# Patient Record
Sex: FEMALE | Race: WHITE | NOT HISPANIC OR LATINO | Employment: OTHER
[De-identification: names, ages, dates, MRNs, and addresses within clinical notes are randomized per-mention and may not be internally consistent; named-entity substitution may affect disease eponyms.]

---

## 2018-01-18 NOTE — PATIENT DISCUSSION
The nature of primary open angle glaucoma was discussed with the patient. The risk factors, including race, thin cornea, age, family history, and high intraocular pressures were discussed. Treatment options including topical therapy, laser treatment, and in some cases surgery were discussed. The need for regular monitoring including visual fields and serial optic nerve imaging was discussed.

## 2018-04-17 ENCOUNTER — NEW PATIENT COMPREHENSIVE (OUTPATIENT)
Age: 63
End: 2018-04-17

## 2018-04-17 DIAGNOSIS — H52.4: ICD-10-CM

## 2018-04-17 DIAGNOSIS — H25.13: ICD-10-CM

## 2018-04-17 PROCEDURE — 92015 DETERMINE REFRACTIVE STATE: CPT

## 2018-04-17 PROCEDURE — 92004 COMPRE OPH EXAM NEW PT 1/>: CPT

## 2018-04-17 ASSESSMENT — KERATOMETRY
OS_K2POWER_DIOPTERS: 44.25
OD_K2POWER_DIOPTERS: 44.00
OD_K1POWER_DIOPTERS: 43.50
OS_K1POWER_DIOPTERS: 43.50
OD_AXISANGLE2_DEGREES: 78
OS_AXISANGLE_DEGREES: 26
OS_AXISANGLE2_DEGREES: 116
OD_AXISANGLE_DEGREES: 168

## 2018-04-17 ASSESSMENT — VISUAL ACUITY
OS_SC: J2
OS_CC: 20/20-1
OD_CC: J3
OD_CC: 20/20-1
OD_SC: 20/20-1
OS_CC: J2
OS_SC: 20/25-1
OD_SC: J5

## 2018-04-17 ASSESSMENT — TONOMETRY
OD_IOP_MMHG: 18
OS_IOP_MMHG: 18

## 2018-10-26 NOTE — PATIENT DISCUSSION
Patient understands condition, prognosis and need for follow up care. <<-----Click here for Discharge Medication Review

## 2018-10-26 NOTE — PATIENT DISCUSSION
IOP elevated somewhat today. Pt states she is consistent with drops. Cont drops as before. IOP ck 4 wk. Consider medication adjustment as needed.

## 2019-01-08 NOTE — PATIENT DISCUSSION
Occusoft lid scrubs QHS. Importance of removing all makeup. Hot compresses followed by gentle lid massage.

## 2019-04-22 ENCOUNTER — ESTABLISHED COMPREHENSIVE EXAM (OUTPATIENT)
Dept: URBAN - METROPOLITAN AREA CLINIC 35 | Facility: CLINIC | Age: 64
End: 2019-04-22

## 2019-04-22 DIAGNOSIS — H52.4: ICD-10-CM

## 2019-04-22 PROCEDURE — 92014 COMPRE OPH EXAM EST PT 1/>: CPT

## 2019-04-22 PROCEDURE — 92015 DETERMINE REFRACTIVE STATE: CPT

## 2019-04-22 ASSESSMENT — KERATOMETRY
OS_K1POWER_DIOPTERS: 43.50
OS_AXISANGLE_DEGREES: 26
OD_AXISANGLE2_DEGREES: 78
OS_K2POWER_DIOPTERS: 44.25
OS_AXISANGLE2_DEGREES: 116
OD_K2POWER_DIOPTERS: 44.00
OD_K1POWER_DIOPTERS: 43.50
OD_AXISANGLE_DEGREES: 168

## 2019-04-22 ASSESSMENT — VISUAL ACUITY
OS_CC: J2
OU_CC: 20/20
OS_SC: 20/25-1
OS_CC: 20/20
OU_CC: J1
OD_CC: 20/20
OD_SC: 20/20
OU_SC: 20/20
OD_CC: J5-

## 2019-04-22 ASSESSMENT — TONOMETRY
OD_IOP_MMHG: 18
OS_IOP_MMHG: 18

## 2019-05-17 ENCOUNTER — DIAGNOSTICS ONLY (OUTPATIENT)
Dept: URBAN - METROPOLITAN AREA CLINIC 35 | Facility: CLINIC | Age: 64
End: 2019-05-17

## 2019-05-17 DIAGNOSIS — H35.371: ICD-10-CM

## 2019-05-17 PROCEDURE — 99211T TECH SERVICE

## 2019-05-17 PROCEDURE — 92134 CPTRZ OPH DX IMG PST SGM RTA: CPT

## 2019-05-17 ASSESSMENT — KERATOMETRY
OS_K1POWER_DIOPTERS: 43.50
OD_K1POWER_DIOPTERS: 43.50
OD_AXISANGLE_DEGREES: 168
OS_AXISANGLE_DEGREES: 26
OD_AXISANGLE2_DEGREES: 78
OS_K2POWER_DIOPTERS: 44.25
OD_K2POWER_DIOPTERS: 44.00
OS_AXISANGLE2_DEGREES: 116

## 2020-01-09 NOTE — PATIENT DISCUSSION
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  Possible artifact of trial lens rim--redo in 6 months--No change in tx.

## 2020-01-28 ENCOUNTER — EST. PATIENT EMERGENCY (OUTPATIENT)
Dept: URBAN - METROPOLITAN AREA CLINIC 35 | Facility: CLINIC | Age: 65
End: 2020-01-28

## 2020-01-28 DIAGNOSIS — H25.13: ICD-10-CM

## 2020-01-28 DIAGNOSIS — H00.025: ICD-10-CM

## 2020-01-28 PROCEDURE — 92012 INTRM OPH EXAM EST PATIENT: CPT

## 2020-01-28 RX ORDER — NEOMYCIN SULFATE, POLYMYXIN B SULFATE AND DEXAMETHASONE 3.5; 10000; 1 MG/G; [USP'U]/G; MG/G
OINTMENT OPHTHALMIC TWICE A DAY
Start: 2020-01-28 | End: 2020-02-04

## 2020-01-28 ASSESSMENT — KERATOMETRY
OD_K1POWER_DIOPTERS: 43.50
OD_K2POWER_DIOPTERS: 44.00
OS_K2POWER_DIOPTERS: 44.25
OD_AXISANGLE_DEGREES: 168
OS_AXISANGLE_DEGREES: 26
OS_K1POWER_DIOPTERS: 43.50
OD_AXISANGLE2_DEGREES: 78
OS_AXISANGLE2_DEGREES: 116

## 2020-01-28 ASSESSMENT — VISUAL ACUITY
OS_SC: 20/30+2
OD_SC: 20/20

## 2020-01-28 ASSESSMENT — TONOMETRY
OD_IOP_MMHG: 8
OS_IOP_MMHG: 8

## 2020-05-18 ENCOUNTER — ESTABLISHED COMPREHENSIVE EXAM (OUTPATIENT)
Dept: URBAN - METROPOLITAN AREA CLINIC 35 | Facility: CLINIC | Age: 65
End: 2020-05-18

## 2020-05-18 DIAGNOSIS — H52.202: ICD-10-CM

## 2020-05-18 DIAGNOSIS — H52.13: ICD-10-CM

## 2020-05-18 PROCEDURE — 92015 DETERMINE REFRACTIVE STATE: CPT

## 2020-05-18 PROCEDURE — 92014 COMPRE OPH EXAM EST PT 1/>: CPT

## 2020-05-18 ASSESSMENT — KERATOMETRY
OD_AXISANGLE_DEGREES: 168
OS_K1POWER_DIOPTERS: 44.5
OD_AXISANGLE2_DEGREES: 78
OS_AXISANGLE_DEGREES: 26
OD_K1POWER_DIOPTERS: 43.50
OS_K2POWER_DIOPTERS: 43.75
OS_AXISANGLE2_DEGREES: 116
OD_K2POWER_DIOPTERS: 44.5

## 2020-05-18 ASSESSMENT — VISUAL ACUITY
OS_CC: 20/20-1
OD_CC: 20/20
OD_CC: J3
OS_CC: J2

## 2020-05-18 ASSESSMENT — TONOMETRY
OD_IOP_MMHG: 13
OS_IOP_MMHG: 13

## 2021-10-21 NOTE — PATIENT DISCUSSION
Recommend adding on SLT OU for better control of IOP with less fluctuation. Pt agrees to proceed. Pt prefers to schedule procedure and then will follow with Dr Jesusita Larios after.

## 2021-11-16 NOTE — PROCEDURE NOTE: CLINICAL
PROCEDURE NOTE: SLT #1 OU. Diagnosis: POAG, Mild. Anesthesia: Topical. Prep: Alphagan 0.15%. Prior to treatment, risks/benefits/alternatives discussed including infection, loss of vision, hemorrhage, cataract, glaucoma, retinal tears or detachment. Lens:  SLT laser lens with goniosol. Power: 1.2/1.2mJ. Total applications: 038/001. Application 315/647 degrees. Patient tolerated procedure well. There were no complications. Post-op instructions given. Post-op IOP = 32/25 mmHg. One drop Cosopt PF given at 8:33 Iop 21/21.

## 2021-11-16 NOTE — PATIENT DISCUSSION
Plan SLT OU today, will have pt hold Latanoprost. Pt prefers to follow with OD in [address] office after for ongoing care.

## 2021-12-15 NOTE — PATIENT DISCUSSION
Plan SLT OU today, will have pt hold Latanoprost. Pt prefers to follow with OD in [address] office after for ongoing care.

## 2021-12-29 NOTE — PATIENT DISCUSSION
Plan SLT OU today, will have pt hold Latanoprost. Pt prefers to follow with OD in [address] office after for ongoing care.

## 2022-03-29 NOTE — PATIENT DISCUSSION
Plan SLT OU today, will have pt hold Latanoprost. Pt prefers to follow with OD in [address] office after for ongoing care.

## 2022-08-16 ENCOUNTER — COMPREHENSIVE EXAM (OUTPATIENT)
Dept: URBAN - METROPOLITAN AREA CLINIC 35 | Facility: CLINIC | Age: 67
End: 2022-08-16

## 2022-08-16 DIAGNOSIS — H35.371: ICD-10-CM

## 2022-08-16 DIAGNOSIS — H52.13: ICD-10-CM

## 2022-08-16 DIAGNOSIS — H25.13: ICD-10-CM

## 2022-08-16 PROCEDURE — 92015 DETERMINE REFRACTIVE STATE: CPT

## 2022-08-16 PROCEDURE — 92014 COMPRE OPH EXAM EST PT 1/>: CPT

## 2022-08-16 PROCEDURE — 92134 CPTRZ OPH DX IMG PST SGM RTA: CPT

## 2022-08-16 ASSESSMENT — KERATOMETRY
OS_AXISANGLE2_DEGREES: 116
OS_K1POWER_DIOPTERS: 44.5
OS_K2POWER_DIOPTERS: 43.75
OS_AXISANGLE_DEGREES: 26
OD_K2POWER_DIOPTERS: 44.5
OD_AXISANGLE_DEGREES: 168
OD_AXISANGLE2_DEGREES: 78
OD_K1POWER_DIOPTERS: 43.50

## 2022-08-16 ASSESSMENT — VISUAL ACUITY
OS_CC: J1
OS_CC: 20/20-1
OS_SC: 20/25+2
OU_SC: 20/20
OD_CC: J2
OD_SC: 20/20-1

## 2022-08-16 ASSESSMENT — TONOMETRY
OD_IOP_MMHG: 15
OS_IOP_MMHG: 15

## 2022-11-08 NOTE — PATIENT DISCUSSION
Plan SLT OU today, will have pt hold Latanoprost. Pt prefers to follow with OD in [address] office after for ongoing care.

## 2024-06-14 NOTE — PATIENT DISCUSSION
Pt wishes to stay in [address] office vs [facility]. RLE erythema x 1 day RLE erythema x 1 day